# Patient Record
Sex: MALE | Race: BLACK OR AFRICAN AMERICAN | NOT HISPANIC OR LATINO | Employment: FULL TIME | URBAN - METROPOLITAN AREA
[De-identification: names, ages, dates, MRNs, and addresses within clinical notes are randomized per-mention and may not be internally consistent; named-entity substitution may affect disease eponyms.]

---

## 2023-12-09 ENCOUNTER — HOSPITAL ENCOUNTER (EMERGENCY)
Facility: CLINIC | Age: 38
Discharge: HOME | End: 2023-12-10
Attending: EMERGENCY MEDICINE | Admitting: EMERGENCY MEDICINE

## 2023-12-09 ENCOUNTER — APPOINTMENT (OUTPATIENT)
Dept: GENERAL RADIOLOGY | Facility: CLINIC | Age: 38
End: 2023-12-09
Attending: EMERGENCY MEDICINE

## 2023-12-09 ENCOUNTER — APPOINTMENT (OUTPATIENT)
Dept: CT IMAGING | Facility: CLINIC | Age: 38
End: 2023-12-09
Attending: EMERGENCY MEDICINE

## 2023-12-09 DIAGNOSIS — I10 HYPERTENSION, UNSPECIFIED TYPE: ICD-10-CM

## 2023-12-09 DIAGNOSIS — R51.9 HEADACHE, UNSPECIFIED HEADACHE TYPE: ICD-10-CM

## 2023-12-09 LAB
ALBUMIN SERPL BCG-MCNC: 4.4 G/DL (ref 3.5–5.2)
ALP SERPL-CCNC: 65 U/L (ref 40–150)
ALT SERPL W P-5'-P-CCNC: 51 U/L (ref 0–70)
ANION GAP SERPL CALCULATED.3IONS-SCNC: 11 MMOL/L (ref 7–15)
AST SERPL W P-5'-P-CCNC: 25 U/L (ref 0–45)
ATRIAL RATE - MUSE: 72 BPM
ATRIAL RATE - MUSE: 75 BPM
BASOPHILS # BLD AUTO: 0 10E3/UL (ref 0–0.2)
BASOPHILS NFR BLD AUTO: 1 %
BILIRUB SERPL-MCNC: 0.5 MG/DL
BUN SERPL-MCNC: 14.4 MG/DL (ref 6–20)
CALCIUM SERPL-MCNC: 9.8 MG/DL (ref 8.6–10)
CHLORIDE SERPL-SCNC: 100 MMOL/L (ref 98–107)
CREAT SERPL-MCNC: 1.14 MG/DL (ref 0.67–1.17)
DEPRECATED HCO3 PLAS-SCNC: 25 MMOL/L (ref 22–29)
DIASTOLIC BLOOD PRESSURE - MUSE: NORMAL MMHG
DIASTOLIC BLOOD PRESSURE - MUSE: NORMAL MMHG
EGFRCR SERPLBLD CKD-EPI 2021: 83 ML/MIN/1.73M2
EOSINOPHIL # BLD AUTO: 0.1 10E3/UL (ref 0–0.7)
EOSINOPHIL NFR BLD AUTO: 1 %
ERYTHROCYTE [DISTWIDTH] IN BLOOD BY AUTOMATED COUNT: 11.8 % (ref 10–15)
FLUAV RNA SPEC QL NAA+PROBE: NEGATIVE
FLUBV RNA RESP QL NAA+PROBE: NEGATIVE
GLUCOSE SERPL-MCNC: 116 MG/DL (ref 70–99)
HCT VFR BLD AUTO: 47.3 % (ref 40–53)
HGB BLD-MCNC: 16.2 G/DL (ref 13.3–17.7)
IMM GRANULOCYTES # BLD: 0 10E3/UL
IMM GRANULOCYTES NFR BLD: 0 %
INTERPRETATION ECG - MUSE: NORMAL
INTERPRETATION ECG - MUSE: NORMAL
LYMPHOCYTES # BLD AUTO: 2.7 10E3/UL (ref 0.8–5.3)
LYMPHOCYTES NFR BLD AUTO: 43 %
MCH RBC QN AUTO: 30.6 PG (ref 26.5–33)
MCHC RBC AUTO-ENTMCNC: 34.2 G/DL (ref 31.5–36.5)
MCV RBC AUTO: 89 FL (ref 78–100)
MONOCYTES # BLD AUTO: 0.5 10E3/UL (ref 0–1.3)
MONOCYTES NFR BLD AUTO: 8 %
NEUTROPHILS # BLD AUTO: 2.9 10E3/UL (ref 1.6–8.3)
NEUTROPHILS NFR BLD AUTO: 47 %
NRBC # BLD AUTO: 0 10E3/UL
NRBC BLD AUTO-RTO: 0 /100
P AXIS - MUSE: 43 DEGREES
P AXIS - MUSE: 47 DEGREES
PLATELET # BLD AUTO: 188 10E3/UL (ref 150–450)
POTASSIUM SERPL-SCNC: 3.7 MMOL/L (ref 3.4–5.3)
PR INTERVAL - MUSE: 166 MS
PR INTERVAL - MUSE: 174 MS
PROT SERPL-MCNC: 7.6 G/DL (ref 6.4–8.3)
QRS DURATION - MUSE: 86 MS
QRS DURATION - MUSE: 88 MS
QT - MUSE: 360 MS
QT - MUSE: 364 MS
QTC - MUSE: 398 MS
QTC - MUSE: 402 MS
R AXIS - MUSE: 67 DEGREES
R AXIS - MUSE: 81 DEGREES
RBC # BLD AUTO: 5.29 10E6/UL (ref 4.4–5.9)
RSV RNA SPEC NAA+PROBE: NEGATIVE
SARS-COV-2 RNA RESP QL NAA+PROBE: NEGATIVE
SODIUM SERPL-SCNC: 136 MMOL/L (ref 135–145)
SYSTOLIC BLOOD PRESSURE - MUSE: NORMAL MMHG
SYSTOLIC BLOOD PRESSURE - MUSE: NORMAL MMHG
T AXIS - MUSE: -76 DEGREES
T AXIS - MUSE: 265 DEGREES
TROPONIN T SERPL HS-MCNC: 11 NG/L
TROPONIN T SERPL HS-MCNC: 12 NG/L
VENTRICULAR RATE- MUSE: 72 BPM
VENTRICULAR RATE- MUSE: 75 BPM
WBC # BLD AUTO: 6.2 10E3/UL (ref 4–11)

## 2023-12-09 PROCEDURE — 87637 SARSCOV2&INF A&B&RSV AMP PRB: CPT | Performed by: EMERGENCY MEDICINE

## 2023-12-09 PROCEDURE — 99285 EMERGENCY DEPT VISIT HI MDM: CPT | Mod: 25

## 2023-12-09 PROCEDURE — 84484 ASSAY OF TROPONIN QUANT: CPT | Performed by: EMERGENCY MEDICINE

## 2023-12-09 PROCEDURE — 93005 ELECTROCARDIOGRAM TRACING: CPT

## 2023-12-09 PROCEDURE — 36415 COLL VENOUS BLD VENIPUNCTURE: CPT | Performed by: EMERGENCY MEDICINE

## 2023-12-09 PROCEDURE — 80053 COMPREHEN METABOLIC PANEL: CPT | Performed by: EMERGENCY MEDICINE

## 2023-12-09 PROCEDURE — 70450 CT HEAD/BRAIN W/O DYE: CPT

## 2023-12-09 PROCEDURE — 85025 COMPLETE CBC W/AUTO DIFF WBC: CPT | Performed by: EMERGENCY MEDICINE

## 2023-12-09 PROCEDURE — 71046 X-RAY EXAM CHEST 2 VIEWS: CPT

## 2023-12-09 ASSESSMENT — ACTIVITIES OF DAILY LIVING (ADL)
ADLS_ACUITY_SCORE: 35
ADLS_ACUITY_SCORE: 35

## 2023-12-09 NOTE — LETTER
December 10, 2023      To Whom It May Concern:      Joseph Gebeh was seen in our Emergency Department today, 12/10/23.  I expect his condition to improve over the next 2 days.  He may return to work/school when improved.    Sincerely,        Belén Mosher RN

## 2023-12-10 VITALS
HEART RATE: 80 BPM | RESPIRATION RATE: 20 BRPM | OXYGEN SATURATION: 98 % | TEMPERATURE: 97.8 F | SYSTOLIC BLOOD PRESSURE: 178 MMHG | WEIGHT: 207 LBS | DIASTOLIC BLOOD PRESSURE: 119 MMHG

## 2023-12-10 PROCEDURE — 250N000013 HC RX MED GY IP 250 OP 250 PS 637: Performed by: EMERGENCY MEDICINE

## 2023-12-10 RX ORDER — ACETAMINOPHEN 500 MG
1000 TABLET ORAL ONCE
Status: COMPLETED | OUTPATIENT
Start: 2023-12-10 | End: 2023-12-10

## 2023-12-10 RX ADMIN — ACETAMINOPHEN 1000 MG: 500 TABLET, FILM COATED ORAL at 00:17

## 2023-12-10 NOTE — ED TRIAGE NOTES
Pt arrives via triage presenting with evaluation for headache. Pt reports headache began this morning with increasing severity throughout the day. Pt arrived to work at 1900, staff upstairs took blood pressure and found to be hypertensive (190s systolic). Pt describes headache 6/10 on the right side radiating into neck.      Triage Assessment (Adult)       Row Name 12/09/23 1941          Triage Assessment    Airway WDL WDL        Respiratory WDL    Respiratory WDL WDL        Skin Circulation/Temperature WDL    Skin Circulation/Temperature WDL WDL        Cardiac WDL    Cardiac WDL WDL        Peripheral/Neurovascular WDL    Peripheral Neurovascular WDL WDL        Cognitive/Neuro/Behavioral WDL    Cognitive/Neuro/Behavioral WDL X  headache on right side of head radiating down neck

## 2023-12-10 NOTE — ED PROVIDER NOTES
"  History     Chief Complaint:  Headache and Hypertension       HPI   Joseph Gebeh is a 38 year old male who presents with a headache. Patient reports he began feeling a right-sided headache about 48 hours ago, but noticed it became more prominent last night before bed. He adds that he woke up this morning with the headache still present, which dissipated after some time, but re-onset this afternoon and has been constant since.  He has not taken anything for his headache.  He describes his headache as a \"pressure\" which radiates down his right neck. He does not have any history of prior episodes. No chest pain, shortness of breath, numbness, tingling, fevers, sore throat, abdominal pain, nausea, cough. He is eating and drinking as normal. No daily medications. No tobacco use. He does not have a personal history of hypertension, but does note a prominent family history of elevated blood pressure. 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     Independent Historian:   None - Patient Only    Review of External Notes:   None     Medications:    The patient is not currently taking any prescribed medications.    Past Medical History:    No past medical history.    Physical Exam   Patient Vitals for the past 24 hrs:   BP Temp Pulse Resp SpO2 Weight   12/10/23 0000 (!) 178/119 -- 80 20 98 % --   12/09/23 2345 (!) 155/110 -- 68 14 -- --   12/09/23 2300 (!) 167/119 -- 69 14 98 % --   12/09/23 2222 (!) 161/103 -- 78 19 -- --   12/09/23 2125 (!) 162/103 -- 80 16 -- --   12/09/23 2120 (!) 148/99 -- 81 20 99 % --   12/09/23 2100 (!) 146/95 -- 75 15 97 % --   12/09/23 1944 (!) 191/119 97.8  F (36.6  C) 89 18 98 % 93.9 kg (207 lb)        Physical Exam    Physical Exam   Constitutional:  Patient is oriented to person, place, and time. They appear well-developed and well-nourished.   HENT:   Mouth/Throat:   Oropharynx is clear and moist.   Eyes:    Conjunctivae normal and EOM are normal. Pupils are equal, round, and reactive to " light.   Neck:    Normal range of motion.   Cardiovascular: Normal rate, regular rhythm and normal heart sounds.  Exam reveals no gallop and no friction rub.  No murmur heard.  Pulmonary/Chest:  Effort normal and breath sounds normal. Patient has no wheezes. Patient has no rales.   Abdominal:   Soft. Bowel sounds are normal. Patient exhibits no mass. There is no tenderness. There is no rebound and no guarding.   Musculoskeletal:  Normal range of motion. Patient exhibits no edema.   Neurological:   Patient is alert and oriented to person, place, and time. Patient has normal strength. No cranial nerve deficit or sensory deficit. GCS 15.  NIH stroke scale of 0  Skin:   Skin is warm and dry. No rash noted. No erythema.   Psychiatric:   Patient has a normal mood and affect. Patient's behavior is normal. Judgment and thought content normal.       Emergency Department Course   ECG  ECG results from 12/09/23   EKG 12-lead, tracing only     Value    Systolic Blood Pressure     Diastolic Blood Pressure     Ventricular Rate 75    Atrial Rate 75    MA Interval 166    QRS Duration 86        QTc 402    P Axis 47    R AXIS 81    T Axis -76    Interpretation ECG        Sinus rhythm  Moderate voltage criteria for LVH, may be normal variant ( Sokolow-Davis , Schaller product )  ST elevation consider anterior injury or acute infarct    Abnormal ECG  No previous ECGs available  Read by: Dr. Lourdes Lemus MD       Imaging:  CT Head w/o Contrast   Final Result   IMPRESSION:   1.  No acute intracranial process.   2.  Suspected Chiari I malformation. Recommend further evaluation with MRI.      XR Chest 2 Views   Final Result   IMPRESSION:       No focal airspace disease. No pleural effusion or pneumothorax.      The cardiomediastinal silhouette is unremarkable.         Report per radiology    Laboratory:  Labs Ordered and Resulted from Time of ED Arrival to Time of ED Departure   COMPREHENSIVE METABOLIC PANEL - Abnormal        Result Value    Sodium 136      Potassium 3.7      Carbon Dioxide (CO2) 25      Anion Gap 11      Urea Nitrogen 14.4      Creatinine 1.14      GFR Estimate 83      Calcium 9.8      Chloride 100      Glucose 116 (*)     Alkaline Phosphatase 65      AST 25      ALT 51      Protein Total 7.6      Albumin 4.4      Bilirubin Total 0.5     TROPONIN T, HIGH SENSITIVITY - Normal    Troponin T, High Sensitivity 12     INFLUENZA A/B, RSV, & SARS-COV2 PCR - Normal    Influenza A PCR Negative      Influenza B PCR Negative      RSV PCR Negative      SARS CoV2 PCR Negative     TROPONIN T, HIGH SENSITIVITY - Normal    Troponin T, High Sensitivity 11     CBC WITH PLATELETS AND DIFFERENTIAL    WBC Count 6.2      RBC Count 5.29      Hemoglobin 16.2      Hematocrit 47.3      MCV 89      MCH 30.6      MCHC 34.2      RDW 11.8      Platelet Count 188      % Neutrophils 47      % Lymphocytes 43      % Monocytes 8      % Eosinophils 1      % Basophils 1      % Immature Granulocytes 0      NRBCs per 100 WBC 0      Absolute Neutrophils 2.9      Absolute Lymphocytes 2.7      Absolute Monocytes 0.5      Absolute Eosinophils 0.1      Absolute Basophils 0.0      Absolute Immature Granulocytes 0.0      Absolute NRBCs 0.0          Emergency Department Course & Assessments:    Interventions:  Medications   acetaminophen (TYLENOL) tablet 1,000 mg (1,000 mg Oral $Given 12/10/23 0017)        Independent Interpretation (X-rays, CTs, rhythm strip):  None    Assessments/Consultations/Discussion of Management or Tests:  ED Course as of 12/10/23 0127   Sat Dec 09, 2023   1955 I evaluated the patient.    2048 C/w General Cardiology Fellow, Dr. Ruben Galvan   2056  Rechecked and updated on findings of concerning EKG. Headache is a 2/10.      Social Determinants of Health affecting care:   None    Disposition:  The patient was discharged to home.     Impression & Plan       Medical Decision Making:  Joseph Gebeh is a 38-year-old female presenting to the  emergency department high blood pressure and a right-sided headache.  The headache is not thunderclap has not had any recent fevers or infections.  He has a totally normal neurologic exam.  He was significantly hypertensive on arrival this has improved somewhat.  CT of his head is normal blood work and imaging all appear normal.  His CT initially showed LVH with possible STEMI however no significant reversible reciprocal changes.  The EKG was repeated and it appeared the same.  I did speak with cardiology regarding the abnormal EKG he was able to review the EKG and agree that this is not a STEMI but most likely LVH.  Delta troponins are reassuringly flat.  There is not appear to be any endorgan damage from his hypertension.  At this time he will be discharged he will check his blood pressure once a day he will follow-up with his primary care doctor and if he persistent having high blood pressure that would likely need to get started on medications.    Diagnosis:    ICD-10-CM    1. Hypertension, unspecified type  I10       2. Headache, unspecified headache type  R51.9            Discharge Medications:  There are no discharge medications for this patient.     Scribe Disclosure:  I, ESTIVEN BRICENO, am serving as a scribe at 7:54 PM on 12/9/2023 to document services personally performed by Lourdes Lemus MD based on my observations and the provider's statements to me.     12/9/2023   Lourdes Lemus MD Bochert, Michelle Ann, MD  12/10/23 0127

## 2023-12-14 ENCOUNTER — HOSPITAL ENCOUNTER (EMERGENCY)
Facility: CLINIC | Age: 38
Discharge: HOME | End: 2023-12-15
Attending: EMERGENCY MEDICINE | Admitting: EMERGENCY MEDICINE

## 2023-12-14 DIAGNOSIS — I10 HYPERTENSION, UNSPECIFIED TYPE: ICD-10-CM

## 2023-12-14 LAB
ANION GAP SERPL CALCULATED.3IONS-SCNC: 8 MMOL/L (ref 7–15)
BASOPHILS # BLD AUTO: 0 10E3/UL (ref 0–0.2)
BASOPHILS NFR BLD AUTO: 0 %
BUN SERPL-MCNC: 15.3 MG/DL (ref 6–20)
CALCIUM SERPL-MCNC: 9.5 MG/DL (ref 8.6–10)
CHLORIDE SERPL-SCNC: 102 MMOL/L (ref 98–107)
CREAT SERPL-MCNC: 1.11 MG/DL (ref 0.67–1.17)
DEPRECATED HCO3 PLAS-SCNC: 28 MMOL/L (ref 22–29)
EGFRCR SERPLBLD CKD-EPI 2021: 86 ML/MIN/1.73M2
EOSINOPHIL # BLD AUTO: 0.1 10E3/UL (ref 0–0.7)
EOSINOPHIL NFR BLD AUTO: 1 %
ERYTHROCYTE [DISTWIDTH] IN BLOOD BY AUTOMATED COUNT: 11.9 % (ref 10–15)
GLUCOSE SERPL-MCNC: 89 MG/DL (ref 70–99)
HCT VFR BLD AUTO: 44.3 % (ref 40–53)
HGB BLD-MCNC: 15.2 G/DL (ref 13.3–17.7)
HOLD SPECIMEN: NORMAL
IMM GRANULOCYTES # BLD: 0 10E3/UL
IMM GRANULOCYTES NFR BLD: 0 %
LYMPHOCYTES # BLD AUTO: 3.3 10E3/UL (ref 0.8–5.3)
LYMPHOCYTES NFR BLD AUTO: 45 %
MCH RBC QN AUTO: 30.9 PG (ref 26.5–33)
MCHC RBC AUTO-ENTMCNC: 34.3 G/DL (ref 31.5–36.5)
MCV RBC AUTO: 90 FL (ref 78–100)
MONOCYTES # BLD AUTO: 0.8 10E3/UL (ref 0–1.3)
MONOCYTES NFR BLD AUTO: 11 %
NEUTROPHILS # BLD AUTO: 3.2 10E3/UL (ref 1.6–8.3)
NEUTROPHILS NFR BLD AUTO: 43 %
NRBC # BLD AUTO: 0 10E3/UL
NRBC BLD AUTO-RTO: 0 /100
PLATELET # BLD AUTO: 191 10E3/UL (ref 150–450)
POTASSIUM SERPL-SCNC: 3.8 MMOL/L (ref 3.4–5.3)
RBC # BLD AUTO: 4.92 10E6/UL (ref 4.4–5.9)
SODIUM SERPL-SCNC: 138 MMOL/L (ref 135–145)
TROPONIN T SERPL HS-MCNC: 11 NG/L
WBC # BLD AUTO: 7.5 10E3/UL (ref 4–11)

## 2023-12-14 PROCEDURE — 85049 AUTOMATED PLATELET COUNT: CPT | Performed by: EMERGENCY MEDICINE

## 2023-12-14 PROCEDURE — 250N000011 HC RX IP 250 OP 636: Mod: JZ | Performed by: EMERGENCY MEDICINE

## 2023-12-14 PROCEDURE — 80048 BASIC METABOLIC PNL TOTAL CA: CPT | Performed by: EMERGENCY MEDICINE

## 2023-12-14 PROCEDURE — 96375 TX/PRO/DX INJ NEW DRUG ADDON: CPT

## 2023-12-14 PROCEDURE — 93005 ELECTROCARDIOGRAM TRACING: CPT

## 2023-12-14 PROCEDURE — 250N000013 HC RX MED GY IP 250 OP 250 PS 637: Performed by: EMERGENCY MEDICINE

## 2023-12-14 PROCEDURE — 99285 EMERGENCY DEPT VISIT HI MDM: CPT | Mod: 25

## 2023-12-14 PROCEDURE — 96376 TX/PRO/DX INJ SAME DRUG ADON: CPT

## 2023-12-14 PROCEDURE — 84484 ASSAY OF TROPONIN QUANT: CPT | Performed by: EMERGENCY MEDICINE

## 2023-12-14 PROCEDURE — 96374 THER/PROPH/DIAG INJ IV PUSH: CPT

## 2023-12-14 PROCEDURE — 36415 COLL VENOUS BLD VENIPUNCTURE: CPT | Performed by: EMERGENCY MEDICINE

## 2023-12-14 RX ORDER — HYDRALAZINE HYDROCHLORIDE 20 MG/ML
10 INJECTION INTRAMUSCULAR; INTRAVENOUS ONCE
Status: COMPLETED | OUTPATIENT
Start: 2023-12-14 | End: 2023-12-14

## 2023-12-14 RX ORDER — AMLODIPINE BESYLATE 5 MG/1
5 TABLET ORAL ONCE
Status: COMPLETED | OUTPATIENT
Start: 2023-12-14 | End: 2023-12-14

## 2023-12-14 RX ORDER — KETOROLAC TROMETHAMINE 15 MG/ML
15 INJECTION, SOLUTION INTRAMUSCULAR; INTRAVENOUS ONCE
Status: COMPLETED | OUTPATIENT
Start: 2023-12-14 | End: 2023-12-14

## 2023-12-14 RX ADMIN — HYDRALAZINE HYDROCHLORIDE 10 MG: 20 INJECTION INTRAMUSCULAR; INTRAVENOUS at 21:39

## 2023-12-14 RX ADMIN — KETOROLAC TROMETHAMINE 15 MG: 15 INJECTION, SOLUTION INTRAMUSCULAR; INTRAVENOUS at 22:44

## 2023-12-14 RX ADMIN — HYDRALAZINE HYDROCHLORIDE 10 MG: 20 INJECTION INTRAMUSCULAR; INTRAVENOUS at 22:43

## 2023-12-14 RX ADMIN — AMLODIPINE BESYLATE 5 MG: 5 TABLET ORAL at 22:43

## 2023-12-14 ASSESSMENT — ACTIVITIES OF DAILY LIVING (ADL)
ADLS_ACUITY_SCORE: 35
ADLS_ACUITY_SCORE: 35

## 2023-12-15 VITALS
OXYGEN SATURATION: 99 % | HEART RATE: 93 BPM | TEMPERATURE: 98 F | DIASTOLIC BLOOD PRESSURE: 106 MMHG | RESPIRATION RATE: 9 BRPM | SYSTOLIC BLOOD PRESSURE: 155 MMHG

## 2023-12-15 LAB
ATRIAL RATE - MUSE: 75 BPM
DIASTOLIC BLOOD PRESSURE - MUSE: NORMAL MMHG
INTERPRETATION ECG - MUSE: NORMAL
P AXIS - MUSE: 44 DEGREES
PR INTERVAL - MUSE: 176 MS
QRS DURATION - MUSE: 88 MS
QT - MUSE: 354 MS
QTC - MUSE: 395 MS
R AXIS - MUSE: 59 DEGREES
SYSTOLIC BLOOD PRESSURE - MUSE: NORMAL MMHG
T AXIS - MUSE: -71 DEGREES
TROPONIN T SERPL HS-MCNC: 11 NG/L
VENTRICULAR RATE- MUSE: 75 BPM

## 2023-12-15 RX ORDER — AMLODIPINE BESYLATE 5 MG/1
5 TABLET ORAL DAILY
Qty: 30 TABLET | Refills: 0 | Status: SHIPPED | OUTPATIENT
Start: 2023-12-15 | End: 2024-01-14

## 2023-12-15 NOTE — DISCHARGE INSTRUCTIONS
Your EKG and blood work is reassuring.  We are giving you a prescription for amlodipine for you to take for high blood pressure.  Keep your appointment with your primary care provider in 2 weeks.  If you continue to have shoulder or chest discomfort, recommend that you take Tylenol ibuprofen.  You can take 1000 mg of Tylenol every 6 hours, and 800 mg of ibuprofen every 6 hours.  You can also try heat and/or ice.  Please return the emergency department if you develop any new or concerning symptoms.

## 2023-12-15 NOTE — ED TRIAGE NOTES
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 Reports he was here on Saturday for hypertensive crisis. Took his BP upstairs and it was 199/135 then 194/133. No hx of this prior to Saturday, no diagnoses.

## 2023-12-15 NOTE — ED NOTES
Bed: ED01  Expected date:   Expected time:   Means of arrival:   Comments:  [employer] - Patient Coming

## 2023-12-15 NOTE — ED PROVIDER NOTES
History     Chief Complaint:  No chief complaint on file.       HPI   Joseph Gebeh is a 38 year old male who presents with hypertension and left shoulder pain. 5 days ago patient developed headache and neck pain at work. He checked his blood pressure and after finding it was elevated he had blood test, CT, and xray which were negative. His blood pressure fell without medication and he was sent home. His pressures were 140-150 systolic at home. Today he developed left shoulder pain and neck pain when he turns his head to the left. While at work today his blood pressure chuck to 1990/75 and he began to have blurry vision prompting ED visit. He denies shortness of breath, current headaches, and daily medication use. Endorses extensive family history of hypertension though he has never been on hypertension medications personally.     Independent Historian:   None - Patient Only    Review of External Notes:   ED note on 12/9/23: Presented with hypertension and headache.  Head CT and chest x-ray were unremarkable.  Headache resolved.      Medications:    The patient is not currently taking any prescribed medications.     Past Medical History:    The patient denies any significant past medical history.     Physical Exam   Patient Vitals for the past 24 hrs:   BP Temp Temp src Pulse Resp SpO2   12/14/23 2143 (!) 165/115 -- -- 73 11 99 %   12/14/23 2100 (!) 166/117 -- -- 73 (!) 8 98 %   12/14/23 2034 (!) 168/126 -- -- 74 -- 99 %   12/14/23 2021 -- 98  F (36.7  C) Oral -- -- --   12/14/23 2020 -- -- -- -- 19 --        Physical Exam  General: Resting on the bed.  Head: No obvious trauma to head.  Ears, Nose, Throat:  External ears normal.  Nose normal.  No pharyngeal erythema, swelling or exudate.  Midline uvula. Moist mucus membranes.  Eyes:  Conjunctivae clear.   Neck: Normal range of motion.  Neck supple.   CV: Regular rate and rhythm.  No murmurs.      Respiratory: Effort normal and breath sounds normal.  No wheezing or  crackles.   Gastrointestinal: Soft.  No distension. There is no tenderness.  There is no rigidity, no rebound and no guarding.   Musculoskeletal: Normal range of motion. Left Shoulder pain with active range of motion, pain in not reproducible with palpation.   Neuro: Alert. Moving all extremities appropriately.  Normal speech.    Skin: Skin is warm and dry.  No rash noted.   Psych: Normal mood and affect. Behavior is normal.      Emergency Department Course   ECG  ECG taken at 2023, ECG read at 2033  Normal sinus rhythm   Left ventricular hypertrophy with repolarization abnormality (sokolow-Davis)  Abnormal ECG   Rate 75 bpm. HI interval 176 ms. QRS duration 88 ms. QT/QTc 354/395 ms. P-R-T axes 44 59 -71.     Laboratory:  Labs Ordered and Resulted from Time of ED Arrival to Time of ED Departure   BASIC METABOLIC PANEL - Normal       Result Value    Sodium 138      Potassium 3.8      Chloride 102      Carbon Dioxide (CO2) 28      Anion Gap 8      Urea Nitrogen 15.3      Creatinine 1.11      GFR Estimate 86      Calcium 9.5      Glucose 89     TROPONIN T, HIGH SENSITIVITY - Normal    Troponin T, High Sensitivity 11     CBC WITH PLATELETS AND DIFFERENTIAL    WBC Count 7.5      RBC Count 4.92      Hemoglobin 15.2      Hematocrit 44.3      MCV 90      MCH 30.9      MCHC 34.3      RDW 11.9      Platelet Count 191      % Neutrophils 43      % Lymphocytes 45      % Monocytes 11      % Eosinophils 1      % Basophils 0      % Immature Granulocytes 0      NRBCs per 100 WBC 0      Absolute Neutrophils 3.2      Absolute Lymphocytes 3.3      Absolute Monocytes 0.8      Absolute Eosinophils 0.1      Absolute Basophils 0.0      Absolute Immature Granulocytes 0.0      Absolute NRBCs 0.0     TROPONIN T, HIGH SENSITIVITY        Procedures   NA    Emergency Department Course & Assessments:  Interventions:  Medications   hydrALAZINE (APRESOLINE) injection 10 mg (10 mg Intravenous $Given 12/14/23 2139)   ketorolac (TORADOL) injection 15  mg (15 mg Intravenous $Given 12/14/23 2244)   hydrALAZINE (APRESOLINE) injection 10 mg (10 mg Intravenous $Given 12/14/23 2243)   amLODIPine (NORVASC) tablet 5 mg (5 mg Oral $Given 12/14/23 2243)        Assessments:  2103 I obtained history and examined the patient as noted above.   2230 I rechecked the patient and explained findings. He is feeling improved.   0023 I reevaluated the patient    Independent Interpretation (X-rays, CTs, rhythm strip):  None    Consultations/Discussion of Management or Tests:  None     Social Determinants of Health affecting care:   None    Disposition:  The patient was discharged to home.     Impression & Plan    Medical Decision Making:  Patient presents with left shoulder pain and hypertension.  He was evaluated emergency department 5 days ago for hypertension and headache.  Lab workup on arteries grossly unremarkable.  He has an appointment with his primary care provider in 2 weeks, but he is not currently on antihypertensives.  No falls or trauma.  Shoulder pain is not reproducible with palpation.  CBC, BMP are grossly unremarkable.  EKG as evidence of left ventricular hypertrophy, and is stable when compared to his previous EKG 5 days ago.  Initial troponin is 11.  Repeat troponin is 11.  He is given a dose of hydralazine for his hypertension, and Toradol for shoulder pain.  He was given a second dose of hydralazine, and amlodipine.  His blood pressure improves.  He continues to have some discomfort, but it has improved after the blood pressure improved and the dose of Toradol.  He is given a prescription for amlodipine, and is instructed to start taking this prior to his follow-up appointment with his primary care provider.  Return precautions are given and he verbalizes understanding.  He is discharged home in stable condition.      Diagnosis:    ICD-10-CM    1. Hypertension, unspecified type  I10            Discharge Medications:  New Prescriptions    No medications on file           Scribe Disclosure:  I, Victor Hugo Khan, am serving as a scribe at 9:04 PM on 12/14/2023 to document services personally performed by Rex Shea MD based on my observations and the provider's statements to me.   12/14/2023   Rex Shea MD Peery, Stephen, MD  12/15/23 0028